# Patient Record
Sex: FEMALE | Race: WHITE | NOT HISPANIC OR LATINO
[De-identification: names, ages, dates, MRNs, and addresses within clinical notes are randomized per-mention and may not be internally consistent; named-entity substitution may affect disease eponyms.]

---

## 2022-06-23 ENCOUNTER — APPOINTMENT (OUTPATIENT)
Dept: SURGERY | Facility: CLINIC | Age: 42
End: 2022-06-23
Payer: COMMERCIAL

## 2022-06-23 ENCOUNTER — NON-APPOINTMENT (OUTPATIENT)
Age: 42
End: 2022-06-23

## 2022-06-23 VITALS — HEIGHT: 61 IN | WEIGHT: 140 LBS | BODY MASS INDEX: 26.43 KG/M2

## 2022-06-23 DIAGNOSIS — Z86.018 PERSONAL HISTORY OF OTHER BENIGN NEOPLASM: ICD-10-CM

## 2022-06-23 DIAGNOSIS — Z86.69 PERSONAL HISTORY OF OTHER DISEASES OF THE NERVOUS SYSTEM AND SENSE ORGANS: ICD-10-CM

## 2022-06-23 DIAGNOSIS — Z78.9 OTHER SPECIFIED HEALTH STATUS: ICD-10-CM

## 2022-06-23 DIAGNOSIS — Z87.442 PERSONAL HISTORY OF URINARY CALCULI: ICD-10-CM

## 2022-06-23 PROCEDURE — 99204 OFFICE O/P NEW MOD 45 MIN: CPT

## 2022-06-23 RX ORDER — OXYCODONE AND ACETAMINOPHEN 5; 325 MG/1; MG/1
5-325 TABLET ORAL
Qty: 12 | Refills: 0 | Status: DISCONTINUED | COMMUNITY
Start: 2022-05-11

## 2022-06-23 RX ORDER — RIZATRIPTAN BENZOATE 5 MG/1
5 TABLET ORAL
Qty: 10 | Refills: 0 | Status: ACTIVE | COMMUNITY
Start: 2022-03-31

## 2022-06-23 RX ORDER — AMITRIPTYLINE HYDROCHLORIDE 10 MG/1
10 TABLET, FILM COATED ORAL
Qty: 90 | Refills: 0 | Status: ACTIVE | COMMUNITY
Start: 2022-05-09

## 2022-06-23 RX ORDER — FAMOTIDINE 40 MG/1
40 TABLET, FILM COATED ORAL
Qty: 90 | Refills: 0 | Status: DISCONTINUED | COMMUNITY
Start: 2022-01-08

## 2022-06-23 RX ORDER — PRAMIPEXOLE DIHYDROCHLORIDE 0.12 MG/1
0.12 TABLET ORAL
Qty: 90 | Refills: 0 | Status: DISCONTINUED | COMMUNITY
Start: 2022-06-20

## 2022-06-23 RX ORDER — IBUPROFEN 600 MG/1
600 TABLET, FILM COATED ORAL
Qty: 30 | Refills: 0 | Status: DISCONTINUED | COMMUNITY
Start: 2022-05-11

## 2022-06-23 NOTE — PHYSICAL EXAM
[de-identified] : no cervical or supraclavicular adenopathy, trachea midline, thyroid with right  enlargement with lower nodule 3.4 cm [Normal] : no neck adenopathy [de-identified] : Skin:  normal appearance.  no rash, nodules, vesicles, or erythema,\par Musculoskeletal:  full range of motion and no deformities appreciated\par Neurological:  grossly intact\par Psychiatric:  oriented to person, place and time with appropriate affect

## 2022-06-23 NOTE — CONSULT LETTER
[Dear  ___] : Dear  [unfilled], [Consult Letter:] : I had the pleasure of evaluating your patient, [unfilled]. [Please see my note below.] : Please see my note below. [Consult Closing:] : Thank you very much for allowing me to participate in the care of this patient.  If you have any questions, please do not hesitate to contact me. [Sincerely,] : Sincerely, [FreeTextEntry2] : Dr. Edita Greer [FreeTextEntry3] : Ofelia Olson MD, FACS\par Assistant Professor of Surgery and Otolaryngology\par [facility]\par  [DrMelissa  ___] : Dr. RANDOLPH

## 2022-06-23 NOTE — HISTORY OF PRESENT ILLNESS
[de-identified] : Patient referred by Dr. Greer for evaluation of suspicious right thyroid nodules.  Patient reports several year history of thyroid nodules which have recently increased in size with increasing pressure and occasional dysphagia.  Patient denies shortness of breath, change in voice or radiation exposure.  Thyroid ultrasound June 2022: Right lobe 4.8 x 2.1 x 1.8 cm with upper 11 x 7 x 8 mm nodule and lower 3.4 x 2.6 x 2.2 cm nodule both increased from prior study.  Left lobe 4.3 x 1.6 x 1.6 cm no nodules noted.  Right upper biopsy AUS with negative Afirma.  Right lower biopsy AUS with suspicious Afirma. I have reviewed all old and new data and available images.

## 2022-06-23 NOTE — ASSESSMENT
[FreeTextEntry1] : Patient with suspicious right thyroid nodules which are enlarging.  I have recommended a right thyroid lobectomy with paratracheal dissection and total thyroidectomy if malignancy identified. I have reviewed the pathophysiology of the disease process, the area anatomy and the rationale for surgery.  I discussed the risks, benefits and alternative treatments which include but are not limited to bleeding, infection, numbness, hoarseness, hypocalcemia, scarring, and need for reoperation.  I have answered the patient's questions to their satisfaction.  The patient wishes to proceed with the recommended procedure.  They will contact my office to schedule surgery.\par 
95

## 2022-06-23 NOTE — REASON FOR VISIT
[Initial Consultation] : an initial consultation for [FreeTextEntry2] : Follicular neoplasm [Other: _____] : [unfilled]

## 2022-06-29 ENCOUNTER — OUTPATIENT (OUTPATIENT)
Dept: OUTPATIENT SERVICES | Facility: HOSPITAL | Age: 42
LOS: 1 days | End: 2022-06-29

## 2022-06-29 VITALS
OXYGEN SATURATION: 98 % | RESPIRATION RATE: 16 BRPM | HEIGHT: 61 IN | WEIGHT: 141.98 LBS | HEART RATE: 70 BPM | SYSTOLIC BLOOD PRESSURE: 102 MMHG | DIASTOLIC BLOOD PRESSURE: 69 MMHG | TEMPERATURE: 98 F

## 2022-06-29 DIAGNOSIS — E04.9 NONTOXIC GOITER, UNSPECIFIED: ICD-10-CM

## 2022-06-29 DIAGNOSIS — D49.7 NEOPLASM OF UNSPECIFIED BEHAVIOR OF ENDOCRINE GLANDS AND OTHER PARTS OF NERVOUS SYSTEM: ICD-10-CM

## 2022-06-29 DIAGNOSIS — Z90.710 ACQUIRED ABSENCE OF BOTH CERVIX AND UTERUS: Chronic | ICD-10-CM

## 2022-06-29 DIAGNOSIS — G43.909 MIGRAINE, UNSPECIFIED, NOT INTRACTABLE, WITHOUT STATUS MIGRAINOSUS: ICD-10-CM

## 2022-06-29 LAB
HCT VFR BLD CALC: 42.4 % — SIGNIFICANT CHANGE UP (ref 34.5–45)
HGB BLD-MCNC: 12.9 G/DL — SIGNIFICANT CHANGE UP (ref 11.5–15.5)
MCHC RBC-ENTMCNC: 26.2 PG — LOW (ref 27–34)
MCHC RBC-ENTMCNC: 30.4 GM/DL — LOW (ref 32–36)
MCV RBC AUTO: 86 FL — SIGNIFICANT CHANGE UP (ref 80–100)
NRBC # BLD: 0 /100 WBCS — SIGNIFICANT CHANGE UP
NRBC # FLD: 0 K/UL — SIGNIFICANT CHANGE UP
PLATELET # BLD AUTO: 307 K/UL — SIGNIFICANT CHANGE UP (ref 150–400)
RBC # BLD: 4.93 M/UL — SIGNIFICANT CHANGE UP (ref 3.8–5.2)
RBC # FLD: 12.5 % — SIGNIFICANT CHANGE UP (ref 10.3–14.5)
WBC # BLD: 7.47 K/UL — SIGNIFICANT CHANGE UP (ref 3.8–10.5)
WBC # FLD AUTO: 7.47 K/UL — SIGNIFICANT CHANGE UP (ref 3.8–10.5)

## 2022-06-29 RX ORDER — SODIUM CHLORIDE 9 MG/ML
1000 INJECTION, SOLUTION INTRAVENOUS
Refills: 0 | Status: DISCONTINUED | OUTPATIENT
Start: 2022-07-13 | End: 2022-07-13

## 2022-06-29 NOTE — H&P PST ADULT - NSICDXPASTSURGICALHX_GEN_ALL_CORE_FT
PAST SURGICAL HISTORY:  H/O: hysterectomy     History of  x2 -09 7 2011    History of lithotripsy

## 2022-06-29 NOTE — H&P PST ADULT - HISTORY OF PRESENT ILLNESS
40 yo female presents to PST unit with pre-op diagnosis of nontoxic goiter scheduled for right thyroid lobectomy with paratracheal dissection, possible frozen section, possible total with Dr. Olson.

## 2022-06-29 NOTE — H&P PST ADULT - NSICDXPASTMEDICALHX_GEN_ALL_CORE_FT
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

## 2022-06-29 NOTE — H&P PST ADULT - ASSESSMENT
40 yo female presents to PST unit with pre-op diagnosis of nontoxic goiter scheduled for right thyroid lobectomy with paratracheal dissection, possible frozen section, possible total with Dr. Olson on 7/13/2022.

## 2022-06-29 NOTE — H&P PST ADULT - PROBLEM SELECTOR PLAN 1
Scheduled for right thyroid lobectomy with paratracheal dissection, possible frozen section, possible total with Dr. Olson on 7/13/2022.   Verbal and written pre-op instructions provided to patient. Patient verbalized understanding and will call surgeons office for revised instructions if surgery is rescheduled.   Continue Pepcid for GI prophylaxis.   patient given verbal and written instruction with teach back on chlorhexidine shampoo, and the patient verbalized understanding with return demonstration.   Patient aware of need for COVID testing prior to  procedure at a [facility], given list of locations and advised to get tested within 3 to 5 days of procedure.

## 2022-07-09 ENCOUNTER — LABORATORY RESULT (OUTPATIENT)
Age: 42
End: 2022-07-09

## 2022-07-12 ENCOUNTER — TRANSCRIPTION ENCOUNTER (OUTPATIENT)
Age: 42
End: 2022-07-12

## 2022-07-13 ENCOUNTER — OUTPATIENT (OUTPATIENT)
Dept: OUTPATIENT SERVICES | Facility: HOSPITAL | Age: 42
LOS: 1 days | Discharge: HOME | End: 2022-07-13

## 2022-07-13 ENCOUNTER — APPOINTMENT (OUTPATIENT)
Dept: SURGERY | Facility: HOSPITAL | Age: 42
End: 2022-07-13

## 2022-07-13 ENCOUNTER — RESULT REVIEW (OUTPATIENT)
Age: 42
End: 2022-07-13

## 2022-07-13 ENCOUNTER — TRANSCRIPTION ENCOUNTER (OUTPATIENT)
Age: 42
End: 2022-07-13

## 2022-07-13 VITALS
OXYGEN SATURATION: 95 % | DIASTOLIC BLOOD PRESSURE: 75 MMHG | RESPIRATION RATE: 16 BRPM | TEMPERATURE: 98 F | SYSTOLIC BLOOD PRESSURE: 118 MMHG | HEART RATE: 77 BPM

## 2022-07-13 VITALS
RESPIRATION RATE: 16 BRPM | DIASTOLIC BLOOD PRESSURE: 69 MMHG | HEART RATE: 73 BPM | TEMPERATURE: 98 F | WEIGHT: 141.98 LBS | SYSTOLIC BLOOD PRESSURE: 107 MMHG | OXYGEN SATURATION: 100 % | HEIGHT: 61 IN

## 2022-07-13 DIAGNOSIS — Z90.710 ACQUIRED ABSENCE OF BOTH CERVIX AND UTERUS: Chronic | ICD-10-CM

## 2022-07-13 DIAGNOSIS — E04.9 NONTOXIC GOITER, UNSPECIFIED: ICD-10-CM

## 2022-07-13 PROCEDURE — 88307 TISSUE EXAM BY PATHOLOGIST: CPT | Mod: 26

## 2022-07-13 PROCEDURE — 88342 IMHCHEM/IMCYTCHM 1ST ANTB: CPT | Mod: 26

## 2022-07-13 PROCEDURE — 13132 CMPLX RPR F/C/C/M/N/AX/G/H/F: CPT | Mod: 59

## 2022-07-13 PROCEDURE — 88341 IMHCHEM/IMCYTCHM EA ADD ANTB: CPT | Mod: 26

## 2022-07-13 PROCEDURE — 88331 PATH CONSLTJ SURG 1 BLK 1SPC: CPT | Mod: 26

## 2022-07-13 PROCEDURE — 60252 REMOVAL OF THYROID: CPT

## 2022-07-13 PROCEDURE — 88334 PATH CONSLTJ SURG CYTO XM EA: CPT | Mod: 26,59

## 2022-07-13 RX ORDER — OXYCODONE HYDROCHLORIDE 5 MG/1
5 TABLET ORAL ONCE
Refills: 0 | Status: DISCONTINUED | OUTPATIENT
Start: 2022-07-13 | End: 2022-07-13

## 2022-07-13 RX ORDER — FENTANYL CITRATE 50 UG/ML
50 INJECTION INTRAVENOUS
Refills: 0 | Status: DISCONTINUED | OUTPATIENT
Start: 2022-07-13 | End: 2022-07-13

## 2022-07-13 RX ORDER — FAMOTIDINE 10 MG/ML
1 INJECTION INTRAVENOUS
Qty: 0 | Refills: 0 | DISCHARGE

## 2022-07-13 RX ORDER — ACETAMINOPHEN 500 MG
650 TABLET ORAL EVERY 6 HOURS
Refills: 0 | Status: DISCONTINUED | OUTPATIENT
Start: 2022-07-13 | End: 2022-07-27

## 2022-07-13 RX ORDER — AMITRIPTYLINE HCL 25 MG
1 TABLET ORAL
Qty: 0 | Refills: 0 | DISCHARGE

## 2022-07-13 RX ORDER — FENTANYL CITRATE 50 UG/ML
25 INJECTION INTRAVENOUS
Refills: 0 | Status: DISCONTINUED | OUTPATIENT
Start: 2022-07-13 | End: 2022-07-13

## 2022-07-13 RX ORDER — BENZOCAINE AND MENTHOL 5; 1 G/100ML; G/100ML
1 LIQUID ORAL
Qty: 0 | Refills: 0 | DISCHARGE
Start: 2022-07-13

## 2022-07-13 RX ORDER — IBUPROFEN 200 MG
1 TABLET ORAL
Qty: 0 | Refills: 0 | DISCHARGE

## 2022-07-13 RX ORDER — ONDANSETRON 8 MG/1
4 TABLET, FILM COATED ORAL ONCE
Refills: 0 | Status: COMPLETED | OUTPATIENT
Start: 2022-07-13 | End: 2022-07-13

## 2022-07-13 RX ORDER — ACETAMINOPHEN 500 MG
2 TABLET ORAL
Qty: 0 | Refills: 0 | DISCHARGE
Start: 2022-07-13

## 2022-07-13 RX ORDER — BENZOCAINE AND MENTHOL 5; 1 G/100ML; G/100ML
1 LIQUID ORAL
Refills: 0 | Status: DISCONTINUED | OUTPATIENT
Start: 2022-07-13 | End: 2022-07-27

## 2022-07-13 RX ORDER — SODIUM CHLORIDE 9 MG/ML
1000 INJECTION, SOLUTION INTRAVENOUS
Refills: 0 | Status: DISCONTINUED | OUTPATIENT
Start: 2022-07-13 | End: 2022-07-27

## 2022-07-13 RX ADMIN — SODIUM CHLORIDE 75 MILLILITER(S): 9 INJECTION, SOLUTION INTRAVENOUS at 17:17

## 2022-07-13 RX ADMIN — OXYCODONE HYDROCHLORIDE 5 MILLIGRAM(S): 5 TABLET ORAL at 19:06

## 2022-07-13 RX ADMIN — OXYCODONE HYDROCHLORIDE 5 MILLIGRAM(S): 5 TABLET ORAL at 18:02

## 2022-07-13 RX ADMIN — ONDANSETRON 4 MILLIGRAM(S): 8 TABLET, FILM COATED ORAL at 19:10

## 2022-07-13 RX ADMIN — FENTANYL CITRATE 50 MICROGRAM(S): 50 INJECTION INTRAVENOUS at 18:14

## 2022-07-13 RX ADMIN — FENTANYL CITRATE 50 MICROGRAM(S): 50 INJECTION INTRAVENOUS at 17:18

## 2022-07-13 RX ADMIN — FENTANYL CITRATE 50 MICROGRAM(S): 50 INJECTION INTRAVENOUS at 18:07

## 2022-07-13 RX ADMIN — FENTANYL CITRATE 50 MICROGRAM(S): 50 INJECTION INTRAVENOUS at 17:45

## 2022-07-13 NOTE — ASU DISCHARGE PLAN (ADULT/PEDIATRIC) - NURSING INSTRUCTIONS
You received IV Tylenol for pain management at 2:30 PM. Please DO NOT take any Tylenol (Acetaminophen) containing products, such as Vicodin, Percocet, Excedrin, and cold medications for the next 6 hours (until 8:30 PM). DO NOT TAKE MORE THAN 3000 MG OF TYLENOL in a 24 hour period.

## 2022-07-13 NOTE — ASU DISCHARGE PLAN (ADULT/PEDIATRIC) - NS MD DC FALL RISK RISK
For information on Fall & Injury Prevention, visit: [email or web address] OR  [email or web address] OR  https://www.cdc.gov/steadi/patient.html

## 2022-07-13 NOTE — BRIEF OPERATIVE NOTE - NSICDXBRIEFPOSTOP_GEN_ALL_CORE_FT
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

## 2022-07-13 NOTE — BRIEF OPERATIVE NOTE - OPERATION/FINDINGS
Right thyroid lobectomy with paratracheal dissection. Frozen sections sent and came back as follicular lesion. PACU for 4 hours and then home with drain.

## 2022-07-13 NOTE — ASU DISCHARGE PLAN (ADULT/PEDIATRIC) - CARE PROVIDER_API CALL
Ofelia Olson)  Surgery  [address] [place] [address], [address]  [place] [address]  Phone: [phone]  Fax: [phone]  Scheduled Appointment: 07/14/2022 11:00 AM

## 2022-07-13 NOTE — BRIEF OPERATIVE NOTE - NSICDXBRIEFPREOP_GEN_ALL_CORE_FT
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

## 2022-07-14 ENCOUNTER — APPOINTMENT (OUTPATIENT)
Dept: SURGERY | Facility: CLINIC | Age: 42
End: 2022-07-14

## 2022-07-14 PROCEDURE — 99024 POSTOP FOLLOW-UP VISIT: CPT

## 2022-07-14 NOTE — ASSESSMENT
[FreeTextEntry1] : Patient with suspicious right thyroid nodules which are enlarging.  Doing well postop.  LUBNA removed.  RTO 1 week.  I have answered her questions to the best my ability.

## 2022-07-14 NOTE — PHYSICAL EXAM
[de-identified] : Dressing intact, LUBNA removed.  No cervical or supraclavicular adenopathy, trachea midline, thyroid without  enlargement no palpable masses [Normal] : no neck adenopathy [de-identified] : Skin:  normal appearance.  no rash, nodules, vesicles, or erythema,\par Musculoskeletal:  full range of motion and no deformities appreciated\par Neurological:  grossly intact\par Psychiatric:  oriented to person, place and time with appropriate affect

## 2022-07-14 NOTE — HISTORY OF PRESENT ILLNESS
[de-identified] : Patient referred by Dr. Greer for evaluation of suspicious right thyroid nodules.  Patient reports several year history of thyroid nodules which have recently increased in size with increasing pressure and occasional dysphagia.  Patient denies shortness of breath, change in voice or radiation exposure.  Thyroid ultrasound June 2022: Right lobe 4.8 x 2.1 x 1.8 cm with upper 11 x 7 x 8 mm nodule and lower 3.4 x 2.6 x 2.2 cm nodule both increased from prior study.  Left lobe 4.3 x 1.6 x 1.6 cm no nodules noted.  Right upper biopsy AUS with negative Afirma.  Right lower biopsy AUS with suspicious Afirma. \par 7/13/22 right thyroid lobectomy with paratracheal dissection.  LUBNA serous.  Patient denies dysphagia, hoarseness, pain or parathesias.  I have reviewed all old and new data and available images.

## 2022-07-20 LAB — SURGICAL PATHOLOGY STUDY: SIGNIFICANT CHANGE UP

## 2022-07-21 ENCOUNTER — APPOINTMENT (OUTPATIENT)
Dept: SURGERY | Facility: CLINIC | Age: 42
End: 2022-07-21

## 2022-07-21 DIAGNOSIS — D49.7 NEOPLASM OF UNSPECIFIED BEHAVIOR OF ENDOCRINE GLANDS AND OTHER PARTS OF NERVOUS SYSTEM: ICD-10-CM

## 2022-07-21 PROCEDURE — 99024 POSTOP FOLLOW-UP VISIT: CPT

## 2022-07-21 NOTE — PHYSICAL EXAM
[de-identified] : Incision healing with min swelling, scar min discussed.   No cervical or supraclavicular adenopathy, trachea midline, thyroid without  enlargement no palpable masses [Normal] : no neck adenopathy [de-identified] : Skin:  normal appearance.  no rash, nodules, vesicles, or erythema,\par Musculoskeletal:  full range of motion and no deformities appreciated\par Neurological:  grossly intact\par Psychiatric:  oriented to person, place and time with appropriate affect

## 2022-07-21 NOTE — ASSESSMENT
[FreeTextEntry1] : Patient with papillary thyroid cancer.  No additional treatment necessary at this time.  Patient will see Dr. Greer in 1 month for blood work.  RTO 4 months.  Follow-up ultrasound 12/2022 .  I reviewed the expected course of illness and the intent of current treatment with the patient. I have answered her questions.\par

## 2022-07-21 NOTE — HISTORY OF PRESENT ILLNESS
[de-identified] : Patient referred by Dr. Greer for evaluation of suspicious right thyroid nodules.  Patient reports several year history of thyroid nodules which have recently increased in size with increasing pressure and occasional dysphagia.  Patient denies shortness of breath, change in voice or radiation exposure.  Thyroid ultrasound June 2022: Right lobe 4.8 x 2.1 x 1.8 cm with upper 11 x 7 x 8 mm nodule and lower 3.4 x 2.6 x 2.2 cm nodule both increased from prior study.  Left lobe 4.3 x 1.6 x 1.6 cm no nodules noted.  Right upper biopsy AUS with negative Afirma.  Right lower biopsy AUS with suspicious Afirma. \par 7/13/22 right thyroid lobectomy with paratracheal dissection.  Pathology 1 cm papillary thyroid cancer with negative lymph nodes..  Patient denies dysphagia, hoarseness, pain or parathesias.  I have reviewed all old and new data and available images.

## 2023-01-05 ENCOUNTER — APPOINTMENT (OUTPATIENT)
Dept: SURGERY | Facility: CLINIC | Age: 43
End: 2023-01-05
Payer: COMMERCIAL

## 2023-01-05 PROCEDURE — 99213 OFFICE O/P EST LOW 20 MIN: CPT

## 2023-01-05 NOTE — ASSESSMENT
[FreeTextEntry1] : Patient with papillary thyroid cancer.  No additional treatment necessary at this time. no evidence of recurrence on  Follow-up ultrasound 12/2022 . will review juanis.  RTO 6 mo  I reviewed the expected course of illness and the intent of current treatment with the patient. I have answered her questions.\par

## 2023-01-05 NOTE — HISTORY OF PRESENT ILLNESS
[de-identified] : Patient referred by Dr. Greer for evaluation of suspicious right thyroid nodules.  Patient reports several year history of thyroid nodules which have recently increased in size with increasing pressure and occasional dysphagia.  Patient denies shortness of breath, change in voice or radiation exposure.  Thyroid ultrasound June 2022: Right lobe 4.8 x 2.1 x 1.8 cm with upper 11 x 7 x 8 mm nodule and lower 3.4 x 2.6 x 2.2 cm nodule both increased from prior study.  Left lobe 4.3 x 1.6 x 1.6 cm no nodules noted.  Right upper biopsy AUS with negative Afirma.  Right lower biopsy AUS with suspicious Afirma. \par 7/13/22 right thyroid lobectomy with paratracheal dissection.  Pathology 1 cm papillary thyroid cancer with negative lymph nodes..  Patient denies dysphagia, hoarseness, pain or parathesias.  Patient was started on 25 mcg of thyroid hormone replacement by Dr. Greer.  Patient reports blood work in a good range.  Neck ultrasound without evidence of recurrence.  Reports not available.  I have reviewed all old and new data and available images.

## 2023-01-05 NOTE — PHYSICAL EXAM
[de-identified] : Incision healing with min swelling, scar min discussed.   No cervical or supraclavicular adenopathy, trachea midline, thyroid without  enlargement no palpable masses [Normal] : no neck adenopathy [de-identified] : Skin:  normal appearance.  no rash, nodules, vesicles, or erythema,\par Musculoskeletal:  full range of motion and no deformities appreciated\par Neurological:  grossly intact\par Psychiatric:  oriented to person, place and time with appropriate affect

## 2023-05-01 NOTE — ASU PATIENT PROFILE, ADULT - NSICDXPASTSURGICALHX_GEN_ALL_CORE_FT
PAST SURGICAL HISTORY:  H/O: hysterectomy     History of  x2 -09 7 2011    History of lithotripsy      SSKI Counseling:  I discussed with the patient the risks of SSKI including but not limited to thyroid abnormalities, metallic taste, GI upset, fever, headache, acne, arthralgias, paraesthesias, lymphadenopathy, easy bleeding, arrhythmias, and allergic reaction.

## 2023-07-11 ENCOUNTER — APPOINTMENT (OUTPATIENT)
Dept: SURGERY | Facility: CLINIC | Age: 43
End: 2023-07-11
Payer: COMMERCIAL

## 2023-07-11 DIAGNOSIS — E04.9 NONTOXIC GOITER, UNSPECIFIED: ICD-10-CM

## 2023-07-11 DIAGNOSIS — C73 MALIGNANT NEOPLASM OF THYROID GLAND: ICD-10-CM

## 2023-07-11 PROCEDURE — 99213 OFFICE O/P EST LOW 20 MIN: CPT

## 2023-07-11 NOTE — HISTORY OF PRESENT ILLNESS
[de-identified] : Patient referred by Dr. Greer for evaluation of suspicious right thyroid nodules.  Patient reports several year history of thyroid nodules which have recently increased in size with increasing pressure and occasional dysphagia.  Patient denies shortness of breath, change in voice or radiation exposure.  Thyroid ultrasound June 2022: Right lobe 4.8 x 2.1 x 1.8 cm with upper 11 x 7 x 8 mm nodule and lower 3.4 x 2.6 x 2.2 cm nodule both increased from prior study.  Left lobe 4.3 x 1.6 x 1.6 cm no nodules noted.  Right upper biopsy AUS with negative Afirma.  Right lower biopsy AUS with suspicious Afirma. \par 7/13/22 right thyroid lobectomy with paratracheal dissection.  Pathology 1 cm papillary thyroid cancer with negative lymph nodes..  Patient denies dysphagia, hoarseness, pain or parathesias.  Patient was started on 25 mcg of thyroid hormone replacement by Dr. Greer.  Blood work May 2023, TSH 1.7.  Neck ultrasound May 2023: Report not available.  I have reviewed all old and new data and available images.

## 2023-07-11 NOTE — PHYSICAL EXAM
[de-identified] : Incision healing with min swelling, scar min discussed.   No cervical or supraclavicular adenopathy, trachea midline, thyroid without  enlargement no palpable masses [Normal] : no neck adenopathy [de-identified] : Skin:  normal appearance.  no rash, nodules, vesicles, or erythema,\par Musculoskeletal:  full range of motion and no deformities appreciated\par Neurological:  grossly intact\par Psychiatric:  oriented to person, place and time with appropriate affect

## 2023-07-11 NOTE — ASSESSMENT
[FreeTextEntry1] : Patient with papillary thyroid cancer.  No additional treatment necessary at this time. no evidence of recurrence on prior ultrasound 12/2022 .   Patient will forward recent report on ultrasound.  Continue with follow-ups every 6 months with Dr. Greer.  I reviewed the expected course of illness and the intent of current treatment with the patient. I have answered her questions.\par

## (undated) DEVICE — CANISTER DISPOSABLE THIN WALL 3000CC

## (undated) DEVICE — SUT ETHILON 3-0 18" FS-1

## (undated) DEVICE — DRAPE MAGNETIC INSTRUMENT MEDIUM

## (undated) DEVICE — PACK HEAD & NECK

## (undated) DEVICE — LIA-ESU FORCE FX T2E29332EX: Type: DURABLE MEDICAL EQUIPMENT

## (undated) DEVICE — GLV 6.5 PROTEXIS W HYDROGEL

## (undated) DEVICE — SUT VICRYL 0 18" TIES UNDYED

## (undated) DEVICE — DRSG BENZOIN 0.6CC

## (undated) DEVICE — DRAPE FLUID WARMER 44 X 44"

## (undated) DEVICE — VENODYNE/SCD SLEEVE CALF MEDIUM

## (undated) DEVICE — DRSG XEROFORM 5 X 9"

## (undated) DEVICE — LABELS BLANK W PEN

## (undated) DEVICE — PROTECTOR HEEL / ELBOW FLUFFY

## (undated) DEVICE — SUT MONOCRYL 4-0 27" PS-2 UNDYED

## (undated) DEVICE — BIPOLAR FORCEP KIRWAN JEWELERS STR 4" X 0.4MM W 12FT CORD (GREEN)

## (undated) DEVICE — DRAPE LAPAROTOMY TRANSVERSE

## (undated) DEVICE — SUT CHROMIC 3-0 27" SH

## (undated) DEVICE — SUT VICRYL 3-0 18" TIES UNDYED

## (undated) DEVICE — VISITEC 4X4

## (undated) DEVICE — LAP PAD W RING 18 X 18"

## (undated) DEVICE — POSITIONER FOAM EGG CRATE ULNAR 2PCS (PINK)

## (undated) DEVICE — ELCTR GROUNDING PAD ADULT COVIDIEN

## (undated) DEVICE — SOL IRR BAG H2O 2000ML

## (undated) DEVICE — DRAPE TOWEL BLUE 17" X 24"

## (undated) DEVICE — DRAIN JACKSON PRATT 7MM FLAT FULL NO TROCAR

## (undated) DEVICE — SYR ASEPTO

## (undated) DEVICE — DRAIN RESERVOIR FOR JACKSON PRATT 100CC CARDINAL

## (undated) DEVICE — DRAPE 3/4 SHEET 52X76"

## (undated) DEVICE — SUT VICRYL 2-0 18" TIES UNDYED